# Patient Record
Sex: MALE
[De-identification: names, ages, dates, MRNs, and addresses within clinical notes are randomized per-mention and may not be internally consistent; named-entity substitution may affect disease eponyms.]

---

## 2019-02-19 PROBLEM — Z00.00 ENCOUNTER FOR PREVENTIVE HEALTH EXAMINATION: Status: ACTIVE | Noted: 2019-02-19

## 2019-02-22 ENCOUNTER — APPOINTMENT (OUTPATIENT)
Dept: ORTHOPEDIC SURGERY | Facility: CLINIC | Age: 29
End: 2019-02-22
Payer: COMMERCIAL

## 2019-03-08 ENCOUNTER — APPOINTMENT (OUTPATIENT)
Dept: ORTHOPEDIC SURGERY | Facility: CLINIC | Age: 29
End: 2019-03-08
Payer: COMMERCIAL

## 2019-03-08 VITALS
DIASTOLIC BLOOD PRESSURE: 68 MMHG | WEIGHT: 155 LBS | SYSTOLIC BLOOD PRESSURE: 102 MMHG | HEART RATE: 86 BPM | HEIGHT: 64 IN | BODY MASS INDEX: 26.46 KG/M2

## 2019-03-08 DIAGNOSIS — Z78.9 OTHER SPECIFIED HEALTH STATUS: ICD-10-CM

## 2019-03-08 DIAGNOSIS — F17.200 NICOTINE DEPENDENCE, UNSPECIFIED, UNCOMPLICATED: ICD-10-CM

## 2019-03-08 PROCEDURE — 99204 OFFICE O/P NEW MOD 45 MIN: CPT

## 2019-03-08 PROCEDURE — 73564 X-RAY EXAM KNEE 4 OR MORE: CPT | Mod: LT

## 2019-03-08 RX ORDER — DICLOFENAC SODIUM 50 MG/1
50 TABLET, DELAYED RELEASE ORAL
Qty: 60 | Refills: 1 | Status: ACTIVE | COMMUNITY
Start: 2019-03-08 | End: 1900-01-01

## 2019-03-08 NOTE — PHYSICAL EXAM
[de-identified] : Physical Examination\par General: well nourished, in no acute distress, alert and oriented to person, place and time\par Psychiatric: normal mood and affect, no abnormal movements or speech patterns\par Eyes: vision intact - glasses\par Throat: no thyromegaly\par Lymph: no enlarged nodes, no lymphedema in extremity\par Respiratory: no wheezing, no shortness of breath with ambulation\par Cardiac: no cardiac leg swelling, 2+ peripheral pulses\par Neurology: normal gross sensation in extremities to light touch\par Abdomen: soft, non-tender, tympanic, no masses\par \par Musculoskeletal Examination\par Ambulation	- antalgic gait, - assistive devices\par \par Knee			Right			Left\par General\par      Swelling/Deformity	normal			normal	\par      Skin			normal			normal\par      Erythema		-			-\par      Standing Alignment	neutral			neutral\par      Effusion		none			none\par Range of Motion\par      Hip			full painless ROM		full painless ROM\par      Knee Flexion		130			130\par      Knee Extension	0			0\par Patella\par      J Sign		-			-\par      Quad Medial/Lateral	1/1 1/1\par      Apprehension		-			-\par      Kee's		-			+\par      Grind Sign		-			+\par      Crepitus		-			-\par Palpation\par      Medial Joint Line	-			-\par      Medial Fem Condyle	-			-\par      Lateral Joint Line	-			-\par      Quad Tendon		-			-\par      Patella Tendon	-			-\par      Medial Patella		-			-\par      Lateral Patella 	-			+\par      Posterior Knee	-			-\par Ligamentous\par      Varus @ 0° / 30°	-/-			-/-\par      Valgus @ 0° / 30°	-/-			-/-\par      Lachman		-			-\par      Pivot Shift		-			-\par      Anterior Drawer	-			-\par      Posterior Drawer	-			-\par Meniscus\par      Florecita		-			-\par      Flexion Pinch		-			-\par Strength Examination/Atrophy\par      Hip Flexors 		5+			5+\par      Quadriceps		5+			5+\par      Hamstring		5+			5+\par      Tibialis Anterior	5+			5+\par      Achilles/Soleus	5+			5+\par Sensation\par      Deep Peroneal	normal			normal\par      Superficial Peroneal 	normal			normal\par      Sural  		normal			normal\par      Posterior Tibial 	normal			normal\par      Saphneous 		normal			normal\par Pulses\par      DP			2+			2+\par  [de-identified] : 5 views of the affected Left knee (standing AP, flexing standing AP, 30degree flexed lateral, 0degree lateral, sunrise view)\par were ordered, obtained and evaluated by myself today and\par demonstrate:\par There is no narrowing\par no osteophytic lipping\par no suprapatellar effusion\par no patellofemoral joint space loss without evidence of tilt [or] subluxation on sunrise view\par Normal soft tissue density\par Otherwise normal osseous bone structure without fracture or dislocation

## 2019-03-08 NOTE — DISCUSSION/SUMMARY
[de-identified] : Left knee patellofemoral syndrome\par \par \par Patellofemoral syndrome or chondritis or chondromalacia is a spectrum of disease of the cartilage in the joint between the patella, or knee cap, and the femoral trochlea, or thigh bone ranging from overload of the joint, to irritation of the cartilage and finally wear of the cartilage. The patella has a convex v shaped joint surface and the trochlea a matching concave v shape. The patella runs in the trochlea valley as the knee bends, increasing the leverage and allowing knee range of motion. However the patellofemoral joint experiences 20-50x the body weight in force when going up and down stairs during mid-flexion of the knee. The joint is most symptomatic with prolonged knee flexion or going up/down stairs. The treatment for this problem is predominantly non-operative consisting of patient education, activity modification, physical therapy with focus on VMO strengthening, oral and topical non-steroidal anti-inflammatories, knee braces including lateral J/shield's brace or infrapatellar band, and corticosteroid/viscosupplementation injection. The symptoms are chronic and are difficult to treat. In cases of failure to respond to non-operative management surgical options can be considered.\par \par Lateral J brace prescribed\par \par Physical therapy prescribed with knee ROM exercises, quad/hamstring/VMO/Hip abductor strengthening, knee taping, patella mobilization, modalities PRN, and home exercise program.\par \par The patient was prescribed Diclofenac PO non-steroidal anti-inflammatory medication. 50mg tablets twice daily to be taken for at least 1-2 weeks in a row and then PRN afterwards. Risks and benefits were discussed and include but not limited to renal damage and GI ulceration and bleeding.  They were advised to take with food to limit stomach upset as well as warned to stop the medication if worsening gastric pain or dizziness or other side effects. Also to immediately stop the medication and seek appriate medical attention if any severe stomach ache, gastritis, black/red vomit, black/red stools or any other medical concern.\par \par \par \par The patient verifies their understanding the the visit, diagnosis and plan. They agree with the treatment plan and will contact the office with any questions or problems.\par \par Follow up\par If unimproved after therapy

## 2019-03-08 NOTE — HISTORY OF PRESENT ILLNESS
[de-identified] : CC Left knee\par \par HPI 29 yo M right HD presents with acute onset of one to 2 months of intermittent pain in the anterior Left knee after twisting the knee playing soccer. The pain is better, and rated a 5 out of 10, described as sharp, [without radiation]. Rest makes the pain better and flexion of knee and stairs makes the pain worse. The patient reports associated symptoms of grinding. The patient - pain at night affecting sleep, and - similar pain previously.\par \par The patient has tried the following treatments:\par Activity modification	+\par Ice/Compression  	+\par Braces    		-\par Nsaids    		+\par Physical Therapy 	-\par Cortisone Injection	-\par Visco Injection		-\par Arthroscopy		-\par \par Review of Systems is positive for the above musculoskeletal symptoms and is otherwise non-contributory for general, constitutional, psychiatric, neurologic, HEENT, cardiac, respiratory, gastrointestinal, reproductive, lymphatic, and dermatologic complaints.\par \par Consult by Dr Susan Garrison\par \par

## 2019-06-10 ENCOUNTER — APPOINTMENT (OUTPATIENT)
Dept: ORTHOPEDIC SURGERY | Facility: CLINIC | Age: 29
End: 2019-06-10
Payer: COMMERCIAL

## 2019-06-10 DIAGNOSIS — M22.2X2 PATELLOFEMORAL DISORDERS, LEFT KNEE: ICD-10-CM

## 2019-06-10 DIAGNOSIS — M23.92 UNSPECIFIED INTERNAL DERANGEMENT OF LEFT KNEE: ICD-10-CM

## 2019-06-10 PROCEDURE — 99214 OFFICE O/P EST MOD 30 MIN: CPT

## 2019-06-10 RX ORDER — DICLOFENAC SODIUM 50 MG/1
50 TABLET, DELAYED RELEASE ORAL
Qty: 60 | Refills: 1 | Status: ACTIVE | COMMUNITY
Start: 2019-06-10 | End: 1900-01-01

## 2019-06-10 NOTE — DISCUSSION/SUMMARY
[de-identified] : Left knee patellofemoral syndrome\par \par \par Patellofemoral syndrome or chondritis or chondromalacia is a spectrum of disease of the cartilage in the joint between the patella, or knee cap, and the femoral trochlea, or thigh bone ranging from overload of the joint, to irritation of the cartilage and finally wear of the cartilage. The patella has a convex v shaped joint surface and the trochlea a matching concave v shape. The patella runs in the trochlea valley as the knee bends, increasing the leverage and allowing knee range of motion. However the patellofemoral joint experiences 20-50x the body weight in force when going up and down stairs during mid-flexion of the knee. The joint is most symptomatic with prolonged knee flexion or going up/down stairs. The treatment for this problem is predominantly non-operative consisting of patient education, activity modification, physical therapy with focus on VMO strengthening, oral and topical non-steroidal anti-inflammatories, knee braces including lateral J/shield's brace or infrapatellar band, and corticosteroid/viscosupplementation injection. The symptoms are chronic and are difficult to treat. In cases of failure to respond to non-operative management surgical options can be considered.\par \par \par I discussed my findings on history, exam and radiology.\par \par I reviewed the anatomy and function of the periarticular muscles and tendons, patella, ligaments, menisci and cartilage of the knee using models, images and diagrams. Given the current findings for the patient, I recommend proceeding with advanced imaging to better characterize and diagnose the problem to aid patient care, management and treatment guidance as I suspect an internal injury to the knee not diagnosed on non-diagnostic plain radiographs causing the patients symptoms and physical examination to help provide surgical management planning.\par \par Therefore given the patients continued symptoms despite physical therapy, and activity modification, nonsteroidal anti-inflammatories with continued pain affecting ADLs and inability to do activities limiting quality of life as well as locking and instability significant for patient falls potentially placing the patient at increased risk for exacerbating the injury or causing further injury to the knee, an examination and history consistent with injury to an internal knee derangement and a non-diagnostic radiograph I recommend obtaining an MRI to assess the knee's internal structures for preoperative planning. I discussed with the patient that I am ordering an MRI to assess the soft tissue structures in the joint such as ligaments and tendons, as well as to assess the cartilage and menisci as well as for any bony edema. The test will need insurance approval and will take place at a [facility] or outside facility. If the patient elects to obtain the MRI from an outside facility, the patient understands they have been instructed to bring in both the final radiologist read and a CD/DVD with the MRI images to allow review of the imaging test by myself during the follow up visit. I do not recommend obtaining an open standing MRI as the quality of the images is subpar and makes it difficult to diagnose intra-articular derangements and guide care discussion and decision making.\par The patient was prescribed Diclofenac PO non-steroidal anti-inflammatory medication. 50mg tablets twice daily to be taken for at least 1-2 weeks in a row and then PRN afterwards. Risks and benefits were discussed and include but not limited to renal damage and GI ulceration and bleeding.  They were advised to take with food to limit stomach upset as well as warned to stop the medication if worsening gastric pain or dizziness or other side effects. Also to immediately stop the medication and seek appropriate medical attention if any severe stomach ache, gastritis, black/red vomit, black/red stools or any other medical concern.\par \par The patient verifies their understanding the the visit, diagnosis and plan. They agree with the treatment plan and will contact the office with any questions or problems.\par \par FU after MRI is obtained

## 2019-06-10 NOTE — PHYSICAL EXAM
[de-identified] : Physical Examination\par General: well nourished, in no acute distress, alert and oriented to person, place and time\par Psychiatric: normal mood and affect, no abnormal movements or speech patterns\par Eyes: vision intact - glasses\par Throat: no thyromegaly\par Lymph: no enlarged nodes, no lymphedema in extremity\par Respiratory: no wheezing, no shortness of breath with ambulation\par Cardiac: no cardiac leg swelling, 2+ peripheral pulses\par Neurology: normal gross sensation in extremities to light touch\par Abdomen: soft, non-tender, tympanic, no masses\par \par Musculoskeletal Examination\par Ambulation	- antalgic gait, - assistive devices\par \par Knee			Right			Left\par General\par      Swelling/Deformity	normal			normal	\par      Skin			normal			normal\par      Erythema		-			-\par      Standing Alignment	neutral			neutral\par      Effusion		none			none\par Range of Motion\par      Hip			full painless ROM		full painless ROM\par      Knee Flexion		130			130\par      Knee Extension	0			0\par Patella\par      J Sign		-			-\par      Quad Medial/Lateral	1/1 1/1\par      Apprehension		-			-\par      Kee's		-			+\par      Grind Sign		-			+\par      Crepitus		-			-\par Palpation\par      Medial Joint Line	-			+\par      Medial Fem Condyle	-			-\par      Lateral Joint Line	-			-\par      Quad Tendon		-			-\par      Patella Tendon	-			-\par      Medial Patella		-			-\par      Lateral Patella 	-			+\par      Posterior Knee	-			-\par Ligamentous\par      Varus @ 0° / 30°	-/-			-/-\par      Valgus @ 0° / 30°	-/-			-/-\par      Lachman		-			-\par      Pivot Shift		-			-\par      Anterior Drawer	-			-\par      Posterior Drawer	-			-\par Meniscus\par      Florecita		-			+ medial  pain\par      Flexion Pinch		-			-\par Strength Examination/Atrophy\par      Hip Flexors 		5+			5+\par      Quadriceps		5+			5+\par      Hamstring		5+			5+\par      Tibialis Anterior	5+			5+\par      Achilles/Soleus	5+			5+\par Sensation\par      Deep Peroneal	normal			normal\par      Superficial Peroneal 	normal			normal\par      Sural  		normal			normal\par      Posterior Tibial 	normal			normal\par      Saphneous 		normal			normal\par Pulses\par      DP			2+			2+\par  [de-identified] : 5 views of the affected Left knee (standing AP, flexing standing AP, 30degree flexed lateral, 0degree lateral, sunrise view)\par 3-2019\par demonstrate:\par There is no narrowing\par no osteophytic lipping\par no suprapatellar effusion\par no patellofemoral joint space loss without evidence of tilt [or] subluxation on sunrise view\par Normal soft tissue density\par Otherwise normal osseous bone structure without fracture or dislocation\par \par

## 2019-06-10 NOTE — HISTORY OF PRESENT ILLNESS
[de-identified] : CC Left knee\par \par HPI 27 yo M right HD presents for PT and NSAIDs FU of acute onset of one to 2 months of intermittent pain in the anterior Left knee after twisting the knee playing soccer. The pain is better, and rated a 5 out of 10, described as sharp, [without radiation]. Rest makes the pain better and flexion of knee and stairs makes the pain worse. The patient reports associated symptoms of grinding. The patient - pain at night affecting sleep, and - similar pain previously.\par \par The patient has tried the following treatments:\par Activity modification	+\par Ice/Compression  	+\par Braces    		-\par Nsaids    		+ 8 weejs no improvememnt\par Physical Therapy 	+ 8 weeks no improvemment\par Cortisone Injection	-\par Visco Injection		-\par Arthroscopy		-\par \par worsening pain medial, lock+catching and giving way\par Review of Systems is positive for the above musculoskeletal symptoms and is otherwise non-contributory for general, constitutional, psychiatric, neurologic, HEENT, cardiac, respiratory, gastrointestinal, reproductive, lymphatic, and dermatologic complaints.\par \par Consult by Dr Susan Garrison\par \par

## 2019-07-12 ENCOUNTER — APPOINTMENT (OUTPATIENT)
Dept: ORTHOPEDIC SURGERY | Facility: CLINIC | Age: 29
End: 2019-07-12